# Patient Record
Sex: MALE
[De-identification: names, ages, dates, MRNs, and addresses within clinical notes are randomized per-mention and may not be internally consistent; named-entity substitution may affect disease eponyms.]

---

## 2021-09-16 ENCOUNTER — NURSE TRIAGE (OUTPATIENT)
Dept: OTHER | Facility: CLINIC | Age: 51
End: 2021-09-16

## 2021-09-16 NOTE — TELEPHONE ENCOUNTER
Brief description of triage:  Patient calling for concerns for cat bite to back of left hand last night which is swollen and painful today. Triage indicates for patient to see HCP within 4 hours. Assisted patient with finding the nearest [facility] covered by [facility] [place]. Care advice provided, patient verbalizes understanding; denies any other questions or concerns; instructed to call back for any new or worsening symptoms. This triage is a result of a call to [address] [place] [address]. Please do not respond to the triage nurse through this encounter. Any subsequent communication should be directly with the patient. Reason for Disposition   [1] Puncture wound (hole through the skin) AND [2] from a cat bite (or deep claw puncture wound)    Answer Assessment - Initial Assessment Questions  1. ANIMAL: \"What type of animal caused the bite? \" \"Is the injury from a bite or a claw? \" If the animal is a dog or a cat, ask: \"Was it a pet or a stray? \" \"Was it acting ill or behaving strangely? \"      Cat (pet, fully vaccinated,not acting strangely):  Bite, scratches    2. LOCATION: \"Where is the bite located? \"       Back of right hand    3. SIZE: \"How big is the bite? \" \"What does it look like? \"       1 cm long, swollen    4. ONSET: \"When did the bite happen? \" (Minutes or hours ago)       Yesterday at 2030    5. CIRCUMSTANCES: \"Tell me how this happened. \"       Cat fight and he tried to separate the cats    6. TETANUS: \"When was the last tetanus booster? \"      Unsure    7. PREGNANCY: \"Is there any chance you are pregnant? \" \"When was your last menstrual period? \"      n/a    Protocols used: ANIMAL BITE-ADULT-